# Patient Record
(demographics unavailable — no encounter records)

---

## 2025-01-15 NOTE — PLAN
[TextEntry] : The patient was discussed with PA/NP. I agree with the assessment/plan and made any necessary changes.  The submitted E/M billing level for this visit reflects the total time spent on the day of the visit including face-to-face time spent with the patient, non-face-to-face review of medical records and relevant information, documentation, and asynchronous communication with the patient after a visit via phone, email, or patients EHR portal after the visit. The medical records reviewed are either scanned into the chart or reviewed with the patient using a patient's electronic medical records portal for patients with records not available to [facility] via electronic transmission platforms from other institutions and labs.   I have reviewed and verified information regarding the chief complaint and history recorded by the ancillary staff and/or the patient. I have independently reviewed and interpreted tests performed by other physicians and facilities as necessary. I have discussed with the patient differential diagnosis, reason for auxiliary tests if ordered, risks, benefits, alternatives, and complications of each form of therapy were discussed. Social determinants of disease were assessed and necessary measures implemented.   Time spent counseling and performing coordination of care was also included in determining the appropriate EM billing level.

## 2025-01-15 NOTE — ASSESSMENT
[FreeTextEntry1] : 68y/o M with urinary retention, moderate LUTS, here for TOV - Catheter was removed intact for TOV. Patient given strict return precautions if fever, vomiting, or inability to urinate within 8 hours. Pt instructed to drink plenty of fluids, and if unable or has not voided in 8 hours, recommend to go to closest ER for urinary retention evaluation and potential catheter replacement - RBUS - UA/UCx - PSA - F/u in 6 weeks.

## 2025-01-15 NOTE — REVIEW OF SYSTEMS
[Hesitancy] : urinary hesitancy [Nocturia] : nocturia [Negative] : Constitutional [Dysuria] : no dysuria [Incontinence] : no incontinence

## 2025-01-15 NOTE — PHYSICAL EXAM
[General Appearance - Well Developed] : well developed [General Appearance - Well Nourished] : well nourished [Edema] : no peripheral edema [Abdomen Soft] : soft [Urethral Meatus] : meatus normal [Penis Abnormality] : normal circumcised penis [Normal Station and Gait] : the gait and station were normal for the patient's age

## 2025-01-15 NOTE — HISTORY OF PRESENT ILLNESS
[FreeTextEntry1] : Pt is a 68y/o M with pmh COPD, DM, HTN, Schizophrenia presenting to clinic for TOV. He has never seen a urologist before. Admits to urinary frequency q2h, nocturia x1, urinary hesitancy; denies dysuria, hematuria or post void dribbling. Pt went to ER in november due to inability to void and had catheter placed and sent home with abx. He followed up in ER last week for cath change

## 2025-02-12 NOTE — HISTORY OF PRESENT ILLNESS
[FreeTextEntry1] : Follow up visit. [de-identified] : 69 years old M patient with past medical history of HTN, COPD, active smoker, and schizophrenia, presents to the clinic for follow up.  Patient has no complains currently. Presenting for follow-up and renewal of medications.

## 2025-02-12 NOTE — PHYSICAL EXAM
Follow up [No Acute Distress] : no acute distress [Normal Outer Ear/Nose] : the outer ears and nose were normal in appearance [No JVD] : no jugular venous distention [Normal Rate] : normal rate  [Regular Rhythm] : with a regular rhythm [No Edema] : there was no peripheral edema [Soft] : abdomen soft [No CVA Tenderness] : no CVA  tenderness [Coordination Grossly Intact] : coordination grossly intact [Normal] : normal gait, coordination grossly intact, no focal deficits and deep tendon reflexes were 2+ and symmetric [de-identified] : Mild B/l wheezing

## 2025-02-12 NOTE — ASSESSMENT
[FreeTextEntry1] : 69 years old M patient with past medical history of HTN, COPD, active smoker, and schizophrenia, presents to the clinic for follow up.   # Diabetes Mellitus - HbA1c 7.5>6.6% - c/w pioglitazone plus MET  BID - dietary/lifestyle modification - low salt, low fat/chol. High fiber ADA weight reduction diet - Following with Ophthalmology for macular edema and cataract - Following with Podiatry    # HTN  - BP today 170/82 >> repeat 146/80 - increase to Lisinopril 20mg   # Hyperlipidemia - triglycerides 222, HDL 32,   - start Atorvastatin 40mg qhs   # COPD # SOB on exertion - recently saw Pulm  - repeat PFTs - started Anoro 1 puff daily  # Active smoker - Cutting down, now smoking half a pack/day - CT chest 11/24: multiple stable bilateral pulmonary nodules, largest 8mm, inferior left lower lobe. LungRADS 2 Benign. Recommend 12 month repeat screening - Next LDCT due Nov 2025 - discussed smoking cessation with patient. Not interested at this time  # Schizophrenia - Follows with psychiatry  # Vit D deficiency, resolved   # HCM - Does not want colonoscopy. He did one more than 10 years ago and doesn't want to repeat it again- FOBT negative. Repeat next year  - COVID vaccine x2  - Refused Influenza vaccine  - Prevnar 20 and Tdap vaccine today. - RTC in 3 months or PRN - repeat blood work ordered.

## 2025-02-12 NOTE — REVIEW OF SYSTEMS
[Fever] : no fever [Discharge] : no discharge [Earache] : no earache [Chest Pain] : no chest pain [Palpitations] : no palpitations [Shortness Of Breath] : no shortness of breath [Wheezing] : no wheezing [Abdominal Pain] : no abdominal pain [Vomiting] : no vomiting

## 2025-03-13 NOTE — ASSESSMENT
[FreeTextEntry1] : Routine Diabetic Pedal Exam  -Most recent A1C: 6.6% Onychomycosis   -Patient seen and evaluated in clinic today with all questions and concerns addressed and answered.  -Aseptic debridement of nails x10, WOI -Discussed risk of DM2 including Calluses, Pre-ulcerative lesions, ulcers, infections, bone infections, and amputation Educated patient on A1C, diabetic foot care, and daily foot evaluations for proper Maintenace of care and the risks with elevated A1C -Patient to return to clinic in 6 months   [Verbal] : verbal [Patient] : patient [Good - alert, interested, motivated] : Good - alert, interested, motivated

## 2025-03-13 NOTE — HISTORY OF PRESENT ILLNESS
[Sneakers] : nimco [FreeTextEntry1] : 71 yo M presents for DFE, patient states he has painfully elongated toenail x10.  -denies any pain, numbness or tingling to the bilateral lower extremities.   Most recent A1C: 6.6%

## 2025-03-13 NOTE — PHYSICAL EXAM
[General Appearance - Alert] : alert [General Appearance - In No Acute Distress] : in no acute distress [Ankle Swelling (On Exam)] : not present [Varicose Veins Of Lower Extremities] : not present [] : not present [2+] : left foot dorsalis pedis 2+ [Normal Foot/Ankle] : Both lower extremities were exposed and visualized. Standing exam demonstrates normal foot posture and alignment. Hindfoot exam shows no hindfoot valgus or varus [de-identified] : 5/5 muscle strength, no pain on ROM [Skin Color & Pigmentation] : normal skin color and pigmentation [Skin Lesions] : no skin lesions [Foot Ulcer] : no foot ulcer [FreeTextEntry1] : Elongated, discolored and dystrophic nails x10  [Sensation] : the sensory exam was normal to light touch and pinprick [No Focal Deficits] : no focal deficits [Oriented To Time, Place, And Person] : oriented to person, place, and time [Impaired Insight] : insight and judgment were intact

## 2025-05-27 NOTE — HISTORY OF PRESENT ILLNESS
[Current] : current [TextBox_4] : 70 year old male PMHx HTN, COPD, active smoker, Schizophrenia, presents to the Pulm clinic for initial evaluation. Currently smoking 1 pack of cigarettes per day for 55 years. Patient reports shortness of breath, mostly on exertion. Currently uses Symbicort BID and Albuterol PRN (using 3-4 times per day, mostly with exertion). Denies chest pain. Reports poor sleep and daytime sleepiness. STOP BANG 4.   [TextBox_13] : 55

## 2025-05-27 NOTE — REVIEW OF SYSTEMS
[Cough] : cough [Fever] : no fever [Chills] : no chills [Sputum] : no sputum [Chest Discomfort] : no chest discomfort [Edema] : no edema [Palpitations] : no palpitations [GERD] : no gerd [Abdominal Pain] : no abdominal pain [Nausea] : no nausea [Vomiting] : no vomiting [Headache] : no headache [Dizziness] : no dizziness

## 2025-05-27 NOTE — ASSESSMENT
[FreeTextEntry1] : 70 year old male PMHx HTN, COPD, active smoker, Schizophrenia, presents to the Pulm clinic for initial evaluation  # COPD # SOB on exertion - on Symbicort and Albuterol PRN - uses Albuterol 3-4 times per day - reports dyspnea on exertion - repeat PFTs - Anoro 1 puff daily  # Active smoker - Still smoking 1 pack a day - Counseled on importance of smoking cessation as documented above - CT chest 11/24: multiple stable bilateral pulmonary nodules, largest 8mm, inferior left lower lobe. LungRADS 2 Benign. Recommend 12 month repeat screening - Next LDCT due Nov 2025 - discussed smoking cessation with patient, will use lozenges, sent to pharmacy  # SANTOS - STOP BANG 4 - pt declined sleep study for now - claims he is waking up at night because neighbor keeps knocking on the door and waking him up  f/u in 4months

## 2025-05-27 NOTE — PHYSICAL EXAM
[No Acute Distress] : no acute distress [Normal Oropharynx] : normal oropharynx [Normal Appearance] : normal appearance [No Neck Mass] : no neck mass [Normal Rate/Rhythm] : normal rate/rhythm [Normal S1, S2] : normal s1, s2 [No Resp Distress] : no resp distress [Clear to Auscultation Bilaterally] : clear to auscultation bilaterally [Benign] : benign [No Clubbing] : no clubbing [No Edema] : no edema [Oriented x3] : oriented x3

## 2025-07-17 NOTE — HISTORY OF PRESENT ILLNESS
[FreeTextEntry1] : 69 yr old Male with DM. HTN, COPD (on inhalers), schizophrenia and active smoker.  He presents today to the cardiology clinic for a Maintenace visit.  At baseline he reports no activity limitations with SOB nor chest pain/tightness when he is adherent to his COPD inhalers.   He follows up actively with his primary care provider and pulmonologist for his COPD.

## 2025-07-17 NOTE — ASSESSMENT
[FreeTextEntry1] : 69 yr old Male with DM. HTN, COPD (on inhalers), schizophrenia and active smoker.  He presents today to the cardiology clinic for a Maintenace visit.  At baseline he reports no activity limitations with SOB nor chest pain/tightness when he is adherent to his COPD inhalers.  He follows up actively with his primary care provider and pulmonologist for his COPD.  # non specific SOB no evidence of active ischemia  # COPD  # HTN  # DM well controlled  # Schizophrenia   PLAN  - Will do echo cardio - Counseled on active smoking cessation  - Continue high intensity statin since DM coupled with high ASCVD risk score.  -lifestyle modification with heart healthy diet , exercise and weight controle -follow up PMD -Follow up Psych

## 2025-07-17 NOTE — REVIEW OF SYSTEMS
[Fever] : no fever [Chills] : no chills [SOB] : no shortness of breath [Dyspnea on exertion] : not dyspnea during exertion [Chest Discomfort] : no chest discomfort [Leg Claudication] : no intermittent leg claudication [Palpitations] : no palpitations [Orthopnea] : no orthopnea [Syncope] : no syncope

## 2025-07-21 NOTE — PHYSICAL EXAM
[No Acute Distress] : no acute distress [Normal Sclera/Conjunctiva] : normal sclera/conjunctiva [Normal Outer Ear/Nose] : the outer ears and nose were normal in appearance [No JVD] : no jugular venous distention [No Respiratory Distress] : no respiratory distress  [Normal Rate] : normal rate  [No Edema] : there was no peripheral edema [Soft] : abdomen soft [Normal Anterior Cervical Nodes] : no anterior cervical lymphadenopathy [No CVA Tenderness] : no CVA  tenderness [No Joint Swelling] : no joint swelling [Coordination Grossly Intact] : coordination grossly intact [Speech Grossly Normal] : speech grossly normal

## 2025-07-21 NOTE — REVIEW OF SYSTEMS
[Cough] : cough [Fever] : no fever [Discharge] : no discharge [Earache] : no earache [Hearing Loss] : no hearing loss [Chest Pain] : no chest pain [Abdominal Pain] : no abdominal pain [Dysuria] : no dysuria [Joint Pain] : no joint pain [Itching] : no itching [Headache] : no headache [Anxiety] : no anxiety [Easy Bleeding] : no easy bleeding

## 2025-07-21 NOTE — HISTORY OF PRESENT ILLNESS
[FreeTextEntry1] : Follow up visit [de-identified] : 69 years old M patient with past medical history of HTN, COPD, active smoker, and schizophrenia, presents to the clinic for follow up. Patient has no complaints currently.

## 2025-07-21 NOTE — ASSESSMENT
[FreeTextEntry1] : # Diabetes Mellitus - HbA1c 5.7% - c/w pioglitazone plus MET  BID - dietary/lifestyle modification - low salt, low fat/chol. High fiber ADA weight reduction diet - Following with Ophthalmology for macular edema and cataract - Following with Podiatry  # HTN - BP today 119 74 - On Lisinopril 20mg  # Hyperlipidemia -HDL is 38, otherwise normal - Atorvastatin 40mg qhs  # COPD # SOB on exertion # Chronic cough -on Symbicort and Albuterol PRN -uses Albuterol 3-4 times per day - on Anoro 1 puff daily by Pulm  # Active smoker - Still smoking 1 pack a day - Counseled on importance of smoking cessation as documented above - CT chest 11/24: multiple stable bilateral pulmonary nodules, largest 8mm, inferior left lower lobe. LungRADS 2 Benign. Recommend 12 month repeat screening - Next LDCT due Nov 2025  # Schizophrenia - Follows with psychiatry - Latuda 60  #Anemia -microcytic -f u Iron studies and Hg electrophoresis   # Vit D deficiency, resolved  # HCM - Does not want colonoscopy. He did one more than 10 years ago and doesn't want to repeat it again- FOBT negative. f u repeat FOBT - COVID vaccine x2 - Refused Influenza vaccine - Prevnar 20 and Tdap vaccine given  - RTC in 3 months or PRN - repeat blood work ordered. - F U in 3 months